# Patient Record
Sex: MALE | Race: WHITE | NOT HISPANIC OR LATINO | Employment: UNEMPLOYED | URBAN - NONMETROPOLITAN AREA
[De-identification: names, ages, dates, MRNs, and addresses within clinical notes are randomized per-mention and may not be internally consistent; named-entity substitution may affect disease eponyms.]

---

## 2020-11-19 ENCOUNTER — OFFICE VISIT (OUTPATIENT)
Dept: UROLOGY | Facility: CLINIC | Age: 65
End: 2020-11-19
Payer: MEDICARE

## 2020-11-19 VITALS
DIASTOLIC BLOOD PRESSURE: 78 MMHG | BODY MASS INDEX: 28.2 KG/M2 | SYSTOLIC BLOOD PRESSURE: 128 MMHG | WEIGHT: 197 LBS | HEIGHT: 70 IN | TEMPERATURE: 97.6 F | HEART RATE: 72 BPM

## 2020-11-19 DIAGNOSIS — R35.1 NOCTURIA: ICD-10-CM

## 2020-11-19 DIAGNOSIS — N40.1 BPH WITH OBSTRUCTION/LOWER URINARY TRACT SYMPTOMS: ICD-10-CM

## 2020-11-19 DIAGNOSIS — Z80.42 FAMILY HISTORY OF PROSTATE CANCER IN FATHER: Primary | ICD-10-CM

## 2020-11-19 DIAGNOSIS — N13.8 BPH WITH OBSTRUCTION/LOWER URINARY TRACT SYMPTOMS: ICD-10-CM

## 2020-11-19 DIAGNOSIS — R35.0 URINARY FREQUENCY: ICD-10-CM

## 2020-11-19 PROCEDURE — 99204 OFFICE O/P NEW MOD 45 MIN: CPT | Performed by: UROLOGY

## 2020-11-19 RX ORDER — LEVOTHYROXINE SODIUM 0.03 MG/1
25 TABLET ORAL DAILY
COMMUNITY
Start: 2020-07-27 | End: 2021-08-05

## 2020-11-19 RX ORDER — MAGNESIUM L-LACTATE 84 MG
84 TABLET, EXTENDED RELEASE ORAL DAILY
COMMUNITY
Start: 2020-10-05

## 2020-11-19 RX ORDER — PANTOPRAZOLE SODIUM 40 MG/1
TABLET, DELAYED RELEASE ORAL
COMMUNITY
Start: 2020-11-16

## 2020-11-19 RX ORDER — ROSUVASTATIN CALCIUM 20 MG/1
20 TABLET, COATED ORAL DAILY
COMMUNITY
Start: 2020-09-18 | End: 2021-09-18

## 2020-11-19 RX ORDER — IRBESARTAN 150 MG/1
300 TABLET ORAL 2 TIMES DAILY
COMMUNITY
Start: 2020-10-13

## 2020-11-19 RX ORDER — LABETALOL 100 MG/1
100 TABLET, FILM COATED ORAL 2 TIMES DAILY
COMMUNITY
Start: 2020-08-31 | End: 2021-08-31

## 2020-11-19 RX ORDER — AMLODIPINE BESYLATE 2.5 MG/1
5 TABLET ORAL DAILY
COMMUNITY
Start: 2020-11-09

## 2020-11-21 ENCOUNTER — HOSPITAL ENCOUNTER (OUTPATIENT)
Dept: ULTRASOUND IMAGING | Facility: HOSPITAL | Age: 65
Discharge: HOME | End: 2020-11-21
Attending: UROLOGY
Payer: MEDICARE

## 2020-11-21 ENCOUNTER — APPOINTMENT (OUTPATIENT)
Dept: LAB | Facility: HOSPITAL | Age: 65
End: 2020-11-21
Attending: UROLOGY
Payer: MEDICARE

## 2020-11-21 DIAGNOSIS — N40.1 BPH WITH OBSTRUCTION/LOWER URINARY TRACT SYMPTOMS: ICD-10-CM

## 2020-11-21 DIAGNOSIS — R35.0 URINARY FREQUENCY: ICD-10-CM

## 2020-11-21 DIAGNOSIS — R35.1 NOCTURIA: ICD-10-CM

## 2020-11-21 DIAGNOSIS — Z80.42 FAMILY HISTORY OF PROSTATE CANCER IN FATHER: ICD-10-CM

## 2020-11-21 DIAGNOSIS — N13.8 BPH WITH OBSTRUCTION/LOWER URINARY TRACT SYMPTOMS: ICD-10-CM

## 2020-11-21 LAB — PSA SERPL-MCNC: 5.4 NG/ML (ref 0–4)

## 2020-11-21 PROCEDURE — 84153 ASSAY OF PSA TOTAL: CPT

## 2020-11-21 PROCEDURE — 51798 US URINE CAPACITY MEASURE: CPT

## 2020-11-23 ENCOUNTER — TELEPHONE (OUTPATIENT)
Dept: UROLOGY | Facility: MEDICAL CENTER | Age: 65
End: 2020-11-23

## 2020-11-23 DIAGNOSIS — R97.20 ELEVATED PSA: Primary | ICD-10-CM

## 2020-11-27 ENCOUNTER — TELEPHONE (OUTPATIENT)
Dept: UROLOGY | Facility: MEDICAL CENTER | Age: 65
End: 2020-11-27

## 2020-11-27 DIAGNOSIS — R39.14 BENIGN PROSTATIC HYPERPLASIA WITH INCOMPLETE BLADDER EMPTYING: Primary | ICD-10-CM

## 2020-11-27 DIAGNOSIS — N40.1 BENIGN PROSTATIC HYPERPLASIA WITH INCOMPLETE BLADDER EMPTYING: Primary | ICD-10-CM

## 2020-11-27 RX ORDER — TAMSULOSIN HYDROCHLORIDE 0.4 MG/1
0.4 CAPSULE ORAL
Qty: 30 CAPSULE | Refills: 11 | Status: SHIPPED | OUTPATIENT
Start: 2020-11-27 | End: 2021-11-11

## 2021-01-07 ENCOUNTER — OFFICE VISIT (OUTPATIENT)
Dept: UROLOGY | Facility: CLINIC | Age: 66
End: 2021-01-07
Payer: MEDICARE

## 2021-01-07 VITALS
SYSTOLIC BLOOD PRESSURE: 140 MMHG | WEIGHT: 205 LBS | DIASTOLIC BLOOD PRESSURE: 80 MMHG | HEIGHT: 70 IN | BODY MASS INDEX: 29.35 KG/M2 | HEART RATE: 72 BPM

## 2021-01-07 DIAGNOSIS — N40.1 BPH WITH OBSTRUCTION/LOWER URINARY TRACT SYMPTOMS: Primary | ICD-10-CM

## 2021-01-07 DIAGNOSIS — Z80.42 FAMILY HISTORY OF PROSTATE CANCER: ICD-10-CM

## 2021-01-07 DIAGNOSIS — R97.20 ELEVATED PSA: ICD-10-CM

## 2021-01-07 DIAGNOSIS — R33.9 INCOMPLETE BLADDER EMPTYING: ICD-10-CM

## 2021-01-07 DIAGNOSIS — N13.8 BPH WITH OBSTRUCTION/LOWER URINARY TRACT SYMPTOMS: Primary | ICD-10-CM

## 2021-01-07 LAB — POST-VOID RESIDUAL VOLUME, ML POC: 571 ML

## 2021-01-07 PROCEDURE — 99213 OFFICE O/P EST LOW 20 MIN: CPT | Performed by: UROLOGY

## 2021-01-07 PROCEDURE — 51798 US URINE CAPACITY MEASURE: CPT | Performed by: UROLOGY

## 2021-01-07 RX ORDER — FINASTERIDE 5 MG/1
5 TABLET, FILM COATED ORAL DAILY
Qty: 90 TABLET | Refills: 3 | Status: SHIPPED | OUTPATIENT
Start: 2021-01-07 | End: 2021-11-09 | Stop reason: SDUPTHER

## 2021-01-07 RX ORDER — SEMAGLUTIDE 1.34 MG/ML
INJECTION, SOLUTION SUBCUTANEOUS
COMMUNITY
Start: 2020-12-14

## 2021-01-07 RX ORDER — IRBESARTAN 300 MG/1
300 TABLET ORAL
COMMUNITY
Start: 2020-12-07 | End: 2021-12-07

## 2021-01-07 NOTE — LETTER
2021     Eriberto Costa MD  [address] [address]    Patient: Daniel Madrid   YOB: 1955   Date of Visit: 2021       Dear Dr Cline Side: Thank you for referring Piotr Porter to me for evaluation  Below are my notes for this consultation  If you have questions, please do not hesitate to call me  I look forward to following your patient along with you  Sincerely,        Pranav Thomson MD        CC: No Recipients  Pranav Thomson MD  2021  3:59 PM  Sign when Signing Visit  [address] [facility]  [address], [address] [phone]  www  [facility]  org      NAME: Daniel Madrid  AGE: 77 y o  SEX: male  : 1955   MRN: [id number]    DATE: 2021  TIME: 8:35 AM    Assessment and Plan:    BPH with obstruction with incomplete bladder emptying, very large postvoid residuals  This is probably going on for years and he had no hydronephrosis on his renal ultrasound in September  Currently he feels he is voiding well and he is comfortable  No recurrent UTIs  No stones  I am very concerned about any surgical procedures because of his history of stroke and the difficulties in coming off of Eliquis  I therefore per for a more conservative approach with starting Proscar and rechecking an ultrasound in 6 months along with a PSA  Hopefully this will help him empty his bladder better along with Flomax and also if his PSA drops by greater than 50% he is unlikely have prostate cancer  Follow-up in 6 months with a renal ultrasound PSA  Chief Complaint   No chief complaint on file  History of Present Illness   BPH with obstruction with incomplete bladder emptying-renal ultrasound in November showed a PVR of 685 cc  We started him on Flomax 0 4 mg p o  Q h s  And he is here for PVR  He feels that the Flomax helped him  His prostate was measured at 97 cc  He also has an elevated PSA of 5 4 in November and may have a family history of prostate cancer in his father  He is prone to CVA and TIA if he goes off Eliquis  Therefore we have to be very careful about our biopsy decisions  He will need admission with heparin drip if he gets a biopsy  The following portions of the patient's history were reviewed and updated as appropriate: allergies, current medications, past family history, past medical history, past social history, past surgical history and problem list   Past Medical History:   Diagnosis Date    Diabetes mellitus ([facility] [address] )     High cholesterol     Hypertension     Stroke [facility])      Past Surgical History:   Procedure Laterality Date    ANKLE SURGERY Right     BACK SURGERY      REPLACEMENT TOTAL KNEE Bilateral      shoulder  Review of Systems   Review of Systems    Active Problem List   There is no problem list on file for this patient  Objective   There were no vitals taken for this visit      Physical Exam        Current Medications     Current Outpatient Medications:     amLODIPine (NORVASC) 2 5 mg tablet, Take 2 5 mg by mouth 2 (two) times a day, Disp: , Rfl:     apixaban (ELIQUIS) 5 mg, Take 5 mg by mouth 2 (two) times a day, Disp: , Rfl:     irbesartan (AVAPRO) 150 mg tablet, Take 300 mg by mouth 2 (two) times a day, Disp: , Rfl:     labetalol (NORMODYNE) 100 mg tablet, Take 100 mg by mouth 2 (two) times a day, Disp: , Rfl:     levothyroxine 25 mcg tablet, Take 25 mcg by mouth daily, Disp: , Rfl:     magnesium (MAGTAB) 84 MG (7MEQ) TBCR, Take 84 mg by mouth daily, Disp: , Rfl:     metFORMIN (GLUCOPHAGE) 1000 MG tablet, Take 1,000 mg by mouth 2 (two) times a day, Disp: , Rfl:     pantoprazole (PROTONIX) 40 mg tablet, Take by mouth, Disp: , Rfl:     rosuvastatin (CRESTOR) 20 MG tablet, Take 20 mg by mouth daily, Disp: , Rfl:     tamsulosin (FLOMAX) 0 4 mg, Take 1 capsule (0 4 mg total) by mouth daily with dinner, Disp: 30 capsule, Rfl: 11  Time-15 minutes      Jamal Nova MD

## 2021-01-07 NOTE — PROGRESS NOTES
[address] [facility]  [facility]  [address] [facility]  [place], [address] [facility]  [phone]  www  [facility]  org      NAME: Jaki Cowart  AGE: 77 y o  SEX: male  : 1955   MRN: [id number]    DATE: 2021  TIME: 8:35 AM    Assessment and Plan:    BPH with obstruction with incomplete bladder emptying, very large postvoid residuals  This is probably going on for years and he had no hydronephrosis on his renal ultrasound in September  Currently he feels he is voiding well and he is comfortable  No recurrent UTIs  No stones  I am very concerned about any surgical procedures because of his history of stroke and the difficulties in coming off of Eliquis  I therefore per for a more conservative approach with starting Proscar and rechecking an ultrasound in 6 months along with a PSA  Hopefully this will help him empty his bladder better along with Flomax and also if his PSA drops by greater than 50% he is unlikely have prostate cancer  Follow-up in 6 months with a renal ultrasound PSA  Chief Complaint   No chief complaint on file  History of Present Illness   BPH with obstruction with incomplete bladder emptying-renal ultrasound in November showed a PVR of 685 cc  We started him on Flomax 0 4 mg p o  Q h s  And he is here for PVR  He feels that the Flomax helped him  His prostate was measured at 97 cc  He also has an elevated PSA of 5 4 in November and may have a family history of prostate cancer in his father  He is prone to CVA and TIA if he goes off Eliquis  Therefore we have to be very careful about our biopsy decisions  He will need admission with heparin drip if he gets a biopsy      The following portions of the patient's history were reviewed and updated as appropriate: allergies, current medications, past family history, past medical history, past social history, past surgical history and problem list   Past Medical History: Diagnosis Date    Diabetes mellitus ([facility] [address] )     High cholesterol     Hypertension     Stroke [facility])      Past Surgical History:   Procedure Laterality Date    ANKLE SURGERY Right     BACK SURGERY      REPLACEMENT TOTAL KNEE Bilateral      shoulder  Review of Systems   Review of Systems    Active Problem List   There is no problem list on file for this patient  Objective   There were no vitals taken for this visit      Physical Exam        Current Medications     Current Outpatient Medications:     amLODIPine (NORVASC) 2 5 mg tablet, Take 2 5 mg by mouth 2 (two) times a day, Disp: , Rfl:     apixaban (ELIQUIS) 5 mg, Take 5 mg by mouth 2 (two) times a day, Disp: , Rfl:     irbesartan (AVAPRO) 150 mg tablet, Take 300 mg by mouth 2 (two) times a day, Disp: , Rfl:     labetalol (NORMODYNE) 100 mg tablet, Take 100 mg by mouth 2 (two) times a day, Disp: , Rfl:     levothyroxine 25 mcg tablet, Take 25 mcg by mouth daily, Disp: , Rfl:     magnesium (MAGTAB) 84 MG (7MEQ) TBCR, Take 84 mg by mouth daily, Disp: , Rfl:     metFORMIN (GLUCOPHAGE) 1000 MG tablet, Take 1,000 mg by mouth 2 (two) times a day, Disp: , Rfl:     pantoprazole (PROTONIX) 40 mg tablet, Take by mouth, Disp: , Rfl:     rosuvastatin (CRESTOR) 20 MG tablet, Take 20 mg by mouth daily, Disp: , Rfl:     tamsulosin (FLOMAX) 0 4 mg, Take 1 capsule (0 4 mg total) by mouth daily with dinner, Disp: 30 capsule, Rfl: 11  Time-15 minutes      Leeroy Kelly MD

## 2021-03-10 DIAGNOSIS — Z23 ENCOUNTER FOR IMMUNIZATION: ICD-10-CM

## 2021-07-07 ENCOUNTER — HOSPITAL ENCOUNTER (OUTPATIENT)
Dept: ULTRASOUND IMAGING | Facility: HOSPITAL | Age: 66
Discharge: HOME | End: 2021-07-07
Attending: UROLOGY
Payer: MEDICARE

## 2021-07-07 ENCOUNTER — APPOINTMENT (OUTPATIENT)
Dept: LAB | Facility: HOSPITAL | Age: 66
End: 2021-07-07
Attending: UROLOGY
Payer: MEDICARE

## 2021-07-07 DIAGNOSIS — N13.8 BPH WITH OBSTRUCTION/LOWER URINARY TRACT SYMPTOMS: ICD-10-CM

## 2021-07-07 DIAGNOSIS — N40.1 BPH WITH OBSTRUCTION/LOWER URINARY TRACT SYMPTOMS: ICD-10-CM

## 2021-07-07 DIAGNOSIS — R33.9 INCOMPLETE BLADDER EMPTYING: ICD-10-CM

## 2021-07-07 DIAGNOSIS — R97.20 ELEVATED PSA: ICD-10-CM

## 2021-07-07 DIAGNOSIS — Z80.42 FAMILY HISTORY OF PROSTATE CANCER: ICD-10-CM

## 2021-07-07 LAB — PSA SERPL-MCNC: 1.4 NG/ML (ref 0–4)

## 2021-07-07 PROCEDURE — 84153 ASSAY OF PSA TOTAL: CPT

## 2021-07-07 PROCEDURE — 76770 US EXAM ABDO BACK WALL COMP: CPT

## 2021-08-04 RX ORDER — ASPIRIN 81 MG/1
TABLET ORAL
COMMUNITY
Start: 2021-05-19

## 2021-08-04 RX ORDER — FERROUS SULFATE 325(65) MG
TABLET ORAL
COMMUNITY

## 2021-08-04 RX ORDER — METOPROLOL SUCCINATE 50 MG/1
50 TABLET, EXTENDED RELEASE ORAL 2 TIMES DAILY
COMMUNITY
Start: 2021-05-19

## 2021-08-04 NOTE — PROGRESS NOTES
[address] [facility]  [facility]  [address] [facility]  [place], [address] [facility]  [phone]  www  [facility]  org      NAME: Ledora Dancer  AGE: 74 y o  SEX: male  : 1955   MRN: [id number]    DATE: 2021  TIME: 12:08 PM    Assessment and Plan:    BPH with obstruction with incomplete bladder emptying with large postvoid residual-his creatinine is excellent at 0 89 and he has no hydronephrosis  His voiding pattern is to his satisfaction currently  His PSA dropped nicely to 1 4 with the Proscar  He is at high risk of stroke if he goes off of his Eliquis so I wish to avoid surgical treatment  We will continue with the current regimen and when Rezum is available that may be an option to reduce prostate size and he can stay on the Eliquis  Otherwise see me in a year with a PSA and renal ultrasound  Chief Complaint   No chief complaint on file  History of Present Illness   Follow-up BPH with obstruction with incomplete bladder emptying, a very large postvoid residual probably going on for years  I have been avoiding surgical procedures because of his history of stroke in the difficulties in coming off of Eliquis  I started him on Proscar in 2021  He is also on Flomax  Also has a history of elevated PSA  PSA has dropped to 1 4  This is as of 2012  Renal ultrasound 2021 shows that he still has a very large PVR at 673 cc and this was down from 824 cc prevoid  He has a large prostate measuring 55 cc indenting the floor the bladder        The following portions of the patient's history were reviewed and updated as appropriate: allergies, current medications, past family history, past medical history, past social history, past surgical history and problem list   Past Medical History:   Diagnosis Date    Diabetes mellitus ([facility] [address] )     High cholesterol     Hypertension     Stroke [facility])      Past Surgical History:   Procedure Laterality Date    ANKLE SURGERY Right     BACK SURGERY      REPLACEMENT TOTAL KNEE Bilateral      shoulder  Review of Systems   Review of Systems   Constitutional: Negative for fever  Respiratory: Negative for shortness of breath  Cardiovascular: Negative for chest pain  Genitourinary: Negative  Active Problem List   There is no problem list on file for this patient  Objective   /80 (BP Location: Left arm, Patient Position: Sitting)   Pulse 71   Ht 5' 10" (1 778 m)   Wt 93 kg (205 lb)   BMI 29 41 kg/m²     Physical Exam  Constitutional:       Appearance: Normal appearance  HENT:      Head: Normocephalic and atraumatic  Eyes:      Extraocular Movements: Extraocular movements intact  Pulmonary:      Effort: Pulmonary effort is normal    Musculoskeletal:         General: Normal range of motion  Cervical back: Normal range of motion  Skin:     Coloration: Skin is not jaundiced or pale  Neurological:      General: No focal deficit present  Mental Status: He is alert and oriented to person, place, and time  Mental status is at baseline  Psychiatric:         Mood and Affect: Mood normal          Behavior: Behavior normal          Thought Content:  Thought content normal          Judgment: Judgment normal              Current Medications     Current Outpatient Medications:     amLODIPine (NORVASC) 2 5 mg tablet, Take 5 mg by mouth daily , Disp: , Rfl:     apixaban (ELIQUIS) 5 mg, Take 5 mg by mouth 2 (two) times a day, Disp: , Rfl:     aspirin (ECOTRIN LOW STRENGTH) 81 mg EC tablet, Take by mouth, Disp: , Rfl:     finasteride (PROSCAR) 5 mg tablet, Take 1 tablet (5 mg total) by mouth daily, Disp: 90 tablet, Rfl: 3    irbesartan (AVAPRO) 300 mg tablet, Take 300 mg by mouth, Disp: , Rfl:     levothyroxine 25 mcg tablet, Take 25 mcg by mouth daily, Disp: , Rfl:     magnesium (MAGTAB) 84 MG (7MEQ) TBCR, Take 84 mg by mouth daily, Disp: , Rfl:     metFORMIN (GLUCOPHAGE) 1000 MG tablet, Take 500 mg by mouth once , Disp: , Rfl:     metoprolol succinate (TOPROL-XL) 50 mg 24 hr tablet, Take 50 mg by mouth 2 (two) times a day, Disp: , Rfl:     Ozempic, 0 25 or 0 5 MG/DOSE, 2 MG/1 5ML SOPN, INJECT 0 25 MG UNDER THE SKIN ONCE A WEEK, Disp: , Rfl:     pantoprazole (PROTONIX) 40 mg tablet, Take by mouth, Disp: , Rfl:     rosuvastatin (CRESTOR) 20 MG tablet, Take 20 mg by mouth daily, Disp: , Rfl:     tamsulosin (FLOMAX) 0 4 mg, Take 1 capsule (0 4 mg total) by mouth daily with dinner, Disp: 30 capsule, Rfl: 11    ferrous sulfate 325 (65 Fe) mg tablet, Take by mouth, Disp: , Rfl:     irbesartan (AVAPRO) 150 mg tablet, Take 300 mg by mouth 2 (two) times a day, Disp: , Rfl:     labetalol (NORMODYNE) 100 mg tablet, Take 100 mg by mouth 2 (two) times a day, Disp: , Rfl:         Eloina Harris MD

## 2021-08-05 ENCOUNTER — OFFICE VISIT (OUTPATIENT)
Dept: UROLOGY | Facility: CLINIC | Age: 66
End: 2021-08-05
Payer: MEDICARE

## 2021-08-05 VITALS
BODY MASS INDEX: 29.35 KG/M2 | SYSTOLIC BLOOD PRESSURE: 120 MMHG | WEIGHT: 205 LBS | HEIGHT: 70 IN | DIASTOLIC BLOOD PRESSURE: 80 MMHG | HEART RATE: 71 BPM

## 2021-08-05 DIAGNOSIS — N40.1 BPH WITH OBSTRUCTION/LOWER URINARY TRACT SYMPTOMS: Primary | ICD-10-CM

## 2021-08-05 DIAGNOSIS — N13.8 BPH WITH OBSTRUCTION/LOWER URINARY TRACT SYMPTOMS: Primary | ICD-10-CM

## 2021-08-05 DIAGNOSIS — R33.9 INCOMPLETE BLADDER EMPTYING: ICD-10-CM

## 2021-08-05 DIAGNOSIS — R97.20 ELEVATED PSA: ICD-10-CM

## 2021-08-05 PROCEDURE — 99213 OFFICE O/P EST LOW 20 MIN: CPT | Performed by: UROLOGY

## 2021-11-08 ENCOUNTER — TELEPHONE (OUTPATIENT)
Dept: OTHER | Facility: OTHER | Age: 66
End: 2021-11-08

## 2021-11-11 DIAGNOSIS — N40.1 BENIGN PROSTATIC HYPERPLASIA WITH INCOMPLETE BLADDER EMPTYING: ICD-10-CM

## 2021-11-11 DIAGNOSIS — R39.14 BENIGN PROSTATIC HYPERPLASIA WITH INCOMPLETE BLADDER EMPTYING: ICD-10-CM

## 2021-11-11 RX ORDER — TAMSULOSIN HYDROCHLORIDE 0.4 MG/1
CAPSULE ORAL
Qty: 30 CAPSULE | Refills: 0 | Status: SHIPPED | OUTPATIENT
Start: 2021-11-11 | End: 2021-12-27

## 2021-11-18 ENCOUNTER — LAB (OUTPATIENT)
Dept: LAB | Facility: HOSPITAL | Age: 66
End: 2021-11-18
Attending: UROLOGY
Payer: MEDICARE

## 2021-11-18 DIAGNOSIS — R97.20 ELEVATED PSA: ICD-10-CM

## 2021-11-18 PROCEDURE — 84153 ASSAY OF PSA TOTAL: CPT

## 2021-11-19 LAB — PSA SERPL-MCNC: 1.4 NG/ML (ref 0–4)

## 2021-12-27 DIAGNOSIS — N40.1 BENIGN PROSTATIC HYPERPLASIA WITH INCOMPLETE BLADDER EMPTYING: ICD-10-CM

## 2021-12-27 DIAGNOSIS — R39.14 BENIGN PROSTATIC HYPERPLASIA WITH INCOMPLETE BLADDER EMPTYING: ICD-10-CM

## 2021-12-27 RX ORDER — TAMSULOSIN HYDROCHLORIDE 0.4 MG/1
CAPSULE ORAL
Qty: 30 CAPSULE | Refills: 12 | Status: SHIPPED | OUTPATIENT
Start: 2021-12-27

## 2022-07-05 ENCOUNTER — TELEPHONE (OUTPATIENT)
Dept: CARDIOLOGY CLINIC | Facility: CLINIC | Age: 67
End: 2022-07-05

## 2022-07-25 ENCOUNTER — HOSPITAL ENCOUNTER (OUTPATIENT)
Dept: ULTRASOUND IMAGING | Facility: HOSPITAL | Age: 67
Discharge: HOME | End: 2022-07-25
Attending: UROLOGY
Payer: MEDICARE

## 2022-07-25 DIAGNOSIS — N40.1 BPH WITH OBSTRUCTION/LOWER URINARY TRACT SYMPTOMS: ICD-10-CM

## 2022-07-25 DIAGNOSIS — R33.9 INCOMPLETE BLADDER EMPTYING: ICD-10-CM

## 2022-07-25 DIAGNOSIS — N13.8 BPH WITH OBSTRUCTION/LOWER URINARY TRACT SYMPTOMS: ICD-10-CM

## 2022-07-25 PROCEDURE — 76770 US EXAM ABDO BACK WALL COMP: CPT

## 2022-08-29 RX ORDER — POLYETHYLENE GLYCOL 3350, SODIUM CHLORIDE, SODIUM BICARBONATE, POTASSIUM CHLORIDE 420; 11.2; 5.72; 1.48 G/4L; G/4L; G/4L; G/4L
POWDER, FOR SOLUTION ORAL
COMMUNITY
Start: 2022-07-14

## 2022-09-01 ENCOUNTER — OFFICE VISIT (OUTPATIENT)
Dept: UROLOGY | Facility: CLINIC | Age: 67
End: 2022-09-01
Payer: MEDICARE

## 2022-09-01 ENCOUNTER — APPOINTMENT (OUTPATIENT)
Dept: LAB | Facility: HOSPITAL | Age: 67
End: 2022-09-01
Attending: UROLOGY
Payer: MEDICARE

## 2022-09-01 VITALS
SYSTOLIC BLOOD PRESSURE: 160 MMHG | HEART RATE: 86 BPM | WEIGHT: 185 LBS | OXYGEN SATURATION: 97 % | DIASTOLIC BLOOD PRESSURE: 82 MMHG | HEIGHT: 70 IN | BODY MASS INDEX: 26.48 KG/M2

## 2022-09-01 DIAGNOSIS — N40.1 BPH WITH OBSTRUCTION/LOWER URINARY TRACT SYMPTOMS: ICD-10-CM

## 2022-09-01 DIAGNOSIS — N13.8 BPH WITH OBSTRUCTION/LOWER URINARY TRACT SYMPTOMS: ICD-10-CM

## 2022-09-01 DIAGNOSIS — E11.9 TYPE 2 DIABETES MELLITUS WITHOUT COMPLICATION, WITHOUT LONG-TERM CURRENT USE OF INSULIN (HCC): ICD-10-CM

## 2022-09-01 DIAGNOSIS — Z80.42 FAMILY HISTORY OF PROSTATE CANCER IN FATHER: ICD-10-CM

## 2022-09-01 DIAGNOSIS — R33.9 INCOMPLETE BLADDER EMPTYING: ICD-10-CM

## 2022-09-01 DIAGNOSIS — R97.20 ELEVATED PSA: Primary | ICD-10-CM

## 2022-09-01 DIAGNOSIS — R97.20 ELEVATED PSA: ICD-10-CM

## 2022-09-01 LAB — PSA SERPL-MCNC: 3.3 NG/ML (ref 0–4)

## 2022-09-01 PROCEDURE — 84153 ASSAY OF PSA TOTAL: CPT

## 2022-09-01 PROCEDURE — 99214 OFFICE O/P EST MOD 30 MIN: CPT | Performed by: UROLOGY

## 2022-09-01 NOTE — PROGRESS NOTES
[address] [facility]  [facility]  [address] [facility]  [place], [address] [facility]  [phone]  www  [facility]  org      NAME: Vishnu Lainez  AGE: 69 y o  SEX: male  : 1955   MRN: [id number]    DATE: 2022  TIME: 9:26 AM    Assessment and Plan:    BPH with obstruction with incomplete bladder emptying with a very high postvoid residual which is chronic-stable, no hydronephrosis, very happy with his voiding pattern  Recheck kidney bladder ultrasound with PVR in 1 year  I instructed him to make sure that he takes his best effort to empty completely during the test   No indications for surgical therapy presently  Continue with Flomax and the Proscar  History of elevated PSA normalized on Proscar, family history of prostate cancer in his father:  Check PSA and send him the results  We will then check another PSA when he comes back in 1 year  Chief Complaint   No chief complaint on file  History of Present Illness   Follow-up BPH with obstruction with incomplete bladder emptying with a very large postvoid residual which is chronic  History of elevated PSA which normalized with Proscar-has strong family history of prostate cancer in his father  Has been on Eliquis  Have been holding off any surgical treatment due to him being on Eliquis and the difficulties of coming off of the because of his history of stroke  Kidney bladder ultrasound shows no hydronephrosis, no stones and a 605 cc postvoid residual 2022-however he did not empty completely as he could have and he was able urinate a lot more once he got home  Henri Raygoza He has enlarged prostate gland which protrudes into the bladder base  Creatinine was normal at 1 01 3/7/2022  Last PSA was 1 4 2021  He is status post cardiac ablation for atrial fibrillation  Remains on Flomax  Only gets up 1 time a night    Good stream       The following portions of the patient's history were reviewed and updated as appropriate: allergies, current medications, past family history, past medical history, past social history, past surgical history and problem list   Past Medical History:   Diagnosis Date    Diabetes mellitus ([facility] [address] )     High cholesterol     Hypertension     Stroke [facility])      Past Surgical History:   Procedure Laterality Date    ANKLE SURGERY Right     BACK SURGERY      REPLACEMENT TOTAL KNEE Bilateral     SHOULDER SURGERY Right      shoulder  Review of Systems   Review of Systems   Constitutional: Negative for fever  Respiratory: Negative for shortness of breath  Cardiovascular: Negative for chest pain  Genitourinary: Negative  Active Problem List   There is no problem list on file for this patient  Objective   /82 (BP Location: Left arm, Patient Position: Sitting)   Pulse 86   Ht 5' 10" (1 778 m)   Wt 83 9 kg (185 lb)   SpO2 97%   BMI 26 54 kg/m²     Physical Exam  Vitals reviewed  Constitutional:       Appearance: Normal appearance  He is normal weight  HENT:      Head: Normocephalic and atraumatic  Eyes:      Extraocular Movements: Extraocular movements intact  Pulmonary:      Effort: Pulmonary effort is normal    Genitourinary:     Prostate: Normal       Rectum: Normal       Comments: Rectal exam reveals normal tone no masses and his prostate is about 60-80 g, smooth symmetric benign  No nodules  Musculoskeletal:         General: Normal range of motion  Cervical back: Normal range of motion  Skin:     Coloration: Skin is not jaundiced or pale  Neurological:      General: No focal deficit present  Mental Status: He is alert and oriented to person, place, and time  Mental status is at baseline  Psychiatric:         Mood and Affect: Mood normal          Behavior: Behavior normal          Thought Content:  Thought content normal          Judgment: Judgment normal              Current Medications     Current Outpatient Medications:   amLODIPine (NORVASC) 2 5 mg tablet, Take 5 mg by mouth daily , Disp: , Rfl:     apixaban (ELIQUIS) 5 mg, Take 5 mg by mouth 2 (two) times a day, Disp: , Rfl:     finasteride (PROSCAR) 5 mg tablet, Take 1 tablet (5 mg total) by mouth daily, Disp: 90 tablet, Rfl: 3    irbesartan (AVAPRO) 150 mg tablet, Take 300 mg by mouth 2 (two) times a day, Disp: , Rfl:     levothyroxine 25 mcg tablet, Take 25 mcg by mouth daily, Disp: , Rfl:     magnesium (MAGTAB) 84 MG (7MEQ) TBCR, Take 84 mg by mouth daily, Disp: , Rfl:     metoprolol succinate (TOPROL-XL) 50 mg 24 hr tablet, Take 50 mg by mouth 2 (two) times a day, Disp: , Rfl:     Ozempic, 0 25 or 0 5 MG/DOSE, 2 MG/1 5ML SOPN, INJECT 0 25 MG UNDER THE SKIN ONCE A WEEK, Disp: , Rfl:     pantoprazole (PROTONIX) 40 mg tablet, Take by mouth, Disp: , Rfl:     rosuvastatin (CRESTOR) 20 MG tablet, Take 20 mg by mouth daily, Disp: , Rfl:     tamsulosin (FLOMAX) 0 4 mg, TAKE 1 CAPSULE BY MOUTH ONCE DAILY WITH SUPPER, Disp: 30 capsule, Rfl: 12    aspirin (ECOTRIN LOW STRENGTH) 81 mg EC tablet, Take by mouth, Disp: , Rfl:     ferrous sulfate 325 (65 Fe) mg tablet, Take by mouth, Disp: , Rfl:     irbesartan (AVAPRO) 300 mg tablet, Take 300 mg by mouth, Disp: , Rfl:     labetalol (NORMODYNE) 100 mg tablet, Take 100 mg by mouth 2 (two) times a day, Disp: , Rfl:     metFORMIN (GLUCOPHAGE) 1000 MG tablet, Take 500 mg by mouth once , Disp: , Rfl:     polyethylene glycol-electrolytes (NULYTELY) 4000 mL solution, USE AS DIRECTED FOR COLONOSCOPY PREP, Disp: , Rfl:         Tonny Stephen MD

## 2022-09-01 NOTE — PATIENT INSTRUCTIONS
Continue with Flomax and the Proscar    Have PSA drawn and I will send you the results via the portal

## 2022-09-02 DIAGNOSIS — Z80.42 FAMILY HISTORY OF PROSTATE CANCER IN FATHER: Primary | ICD-10-CM

## 2022-09-02 DIAGNOSIS — R97.20 ELEVATED PSA: ICD-10-CM

## 2023-09-01 ENCOUNTER — HOSPITAL ENCOUNTER (OUTPATIENT)
Dept: ULTRASOUND IMAGING | Facility: HOSPITAL | Age: 68
End: 2023-09-01
Attending: UROLOGY
Payer: MEDICARE

## 2023-09-01 DIAGNOSIS — N40.1 BPH WITH OBSTRUCTION/LOWER URINARY TRACT SYMPTOMS: ICD-10-CM

## 2023-09-01 DIAGNOSIS — R33.9 INCOMPLETE BLADDER EMPTYING: ICD-10-CM

## 2023-09-01 DIAGNOSIS — N13.8 BPH WITH OBSTRUCTION/LOWER URINARY TRACT SYMPTOMS: ICD-10-CM

## 2023-09-01 LAB — PSA SERPL-MCNC: 1.41 NG/ML (ref 0–4)

## 2023-09-01 PROCEDURE — 76770 US EXAM ABDO BACK WALL COMP: CPT

## 2023-09-02 ENCOUNTER — APPOINTMENT (OUTPATIENT)
Dept: LAB | Facility: HOSPITAL | Age: 68
End: 2023-09-02
Payer: MEDICARE

## 2023-09-02 DIAGNOSIS — Z80.42 FAMILY HISTORY OF PROSTATE CANCER IN FATHER: ICD-10-CM

## 2023-09-02 DIAGNOSIS — R97.20 ELEVATED PSA: ICD-10-CM

## 2023-09-02 PROCEDURE — 84153 ASSAY OF PSA TOTAL: CPT

## 2023-09-02 PROCEDURE — 36415 COLL VENOUS BLD VENIPUNCTURE: CPT

## 2023-09-03 NOTE — PROGRESS NOTES
UROLOGY PROGRESS NOTE         NAME: Alexis Funez  AGE: 77 y.o. SEX: male  : 1955   MRN: [id number]    DATE: 2023  TIME: 10:10 PM    Assessment and Plan   Procedures     Impression:   1. Incomplete emptying of bladder    2. History of elevated PSA    3. Benign prostatic hyperplasia with nocturia         Plan: Plan follow-up on renal ultrasound report. Stop Flomax continue Proscar. Again I offered to consider intermittent catheterization but the patient declines at this time. He knows to call if he gets voiding symptoms or UTI-like symptoms that worsen. We will give him a slip to get a BMP done to make sure his creatinine stable and if he is doing well follow-up in 1 year. Chief Complaint   No chief complaint on file. History of Present Illness     HPI: Alexis Funez is a 77y.o. year old male who presents with 1 year follow-up. Patient last seen by Dr. Verner Fabian on 2022. Patient with a history of BPH with obstruction and incomplete bladder emptying with very high postvoid residuals which according to Dr. Verner Fabian is stable with no hydronephrosis and happy the way he is voiding. Patient was on Flomax and Proscar. Has a history of elevated PSA that normalized with Proscar. Does have a family history of prostate cancer    Per Dr. Kinga Camargo notes his PVR was run over 600 cc. He was to double void. His PSA on 2023 was 1.4. Recent creatinine level May 2023 was normal at 0.85. Patient had recent renal ultrasound on 2023. This was ordered by Dr. Verner Fabian. Report is pending but on my review no hydronephrosis bilaterally. Patient feels well currently asymptomatic no voiding complaints he does double void. PVR was around 700 cc which is about average. Patient was asked if he can stop the Flomax and Proscar. I recommended stopping Flomax only if he notices symptoms worsening to restart it.         The following portions of the patient's history were reviewed and updated as appropriate: allergies, current medications, past family history, past medical history, past social history, past surgical history and problem list.  Past Medical History:   Diagnosis Date   • Diabetes mellitus ([address])    • High cholesterol    • Hypertension    • Stroke [facility])      Past Surgical History:   Procedure Laterality Date   • ANKLE SURGERY Right    • BACK SURGERY     • REPLACEMENT TOTAL KNEE Bilateral    • SHOULDER SURGERY Right      shoulder  Review of Systems     Const: Denies chills, fever and weight loss. CV: Denies chest pain. Resp: Denies SOB. GI: Denies abdominal pain, nausea and vomiting. : Denies symptoms other than stated above. Musculo: Denies back pain. Objective   There were no vitals taken for this visit. Physical Exam  Const: Appears healthy and well developed. No signs of acute distress present. Resp: Respirations are regular and unlabored. CV: Rate is regular. Rhythm is regular. Abdomen: Abdomen is soft, nontender, and nondistended. Kidneys are not palpable. : Normal external genital exam the bladder was slightly distended but asymptomatic. Prostate enlarged but smooth  Psych: Patient's attitude is cooperative.  Mood is normal. Affect is normal.    Current Medications     Current Outpatient Medications:   •  amLODIPine (NORVASC) 2.5 mg tablet, Take 5 mg by mouth daily , Disp: , Rfl:   •  apixaban (ELIQUIS) 5 mg, Take 5 mg by mouth 2 (two) times a day, Disp: , Rfl:   •  aspirin (ECOTRIN LOW STRENGTH) 81 mg EC tablet, Take by mouth, Disp: , Rfl:   •  ferrous sulfate 325 (65 Fe) mg tablet, Take by mouth, Disp: , Rfl:   •  finasteride (PROSCAR) 5 mg tablet, Take 1 tablet (5 mg total) by mouth daily, Disp: 90 tablet, Rfl: 3  •  irbesartan (AVAPRO) 150 mg tablet, Take 300 mg by mouth 2 (two) times a day, Disp: , Rfl:   •  labetalol (NORMODYNE) 100 mg tablet, Take 100 mg by mouth 2 (two) times a day, Disp: , Rfl:   •  levothyroxine 25 mcg tablet, Take 25 mcg by mouth daily, Disp: , Rfl:   •  magnesium (MAGTAB) 84 MG (7MEQ) TBCR, Take 84 mg by mouth daily, Disp: , Rfl:   •  metFORMIN (GLUCOPHAGE) 1000 MG tablet, Take 500 mg by mouth once , Disp: , Rfl:   •  metoprolol succinate (TOPROL-XL) 50 mg 24 hr tablet, Take 50 mg by mouth 2 (two) times a day, Disp: , Rfl:   •  Ozempic, 0.25 or 0.5 MG/DOSE, 2 MG/1.5ML SOPN, INJECT 0.25 MG UNDER THE SKIN ONCE A WEEK, Disp: , Rfl:   •  pantoprazole (PROTONIX) 40 mg tablet, Take by mouth, Disp: , Rfl:   •  polyethylene glycol-electrolytes (NULYTELY) 4000 mL solution, USE AS DIRECTED FOR COLONOSCOPY PREP, Disp: , Rfl:   •  rosuvastatin (CRESTOR) 20 MG tablet, Take 20 mg by mouth daily, Disp: , Rfl:   •  tamsulosin (FLOMAX) 0.4 mg, TAKE 1 CAPSULE BY MOUTH ONCE DAILY WITH SUPPER, Disp: 30 capsule, Rfl: 12        Georgiana Hendrickson MD

## 2023-09-05 RX ORDER — AMLODIPINE BESYLATE 5 MG/1
TABLET ORAL
COMMUNITY
Start: 2023-06-20

## 2023-09-07 ENCOUNTER — OFFICE VISIT (OUTPATIENT)
Dept: UROLOGY | Facility: CLINIC | Age: 68
End: 2023-09-07
Payer: MEDICARE

## 2023-09-07 ENCOUNTER — APPOINTMENT (OUTPATIENT)
Dept: LAB | Facility: HOSPITAL | Age: 68
End: 2023-09-07
Payer: MEDICARE

## 2023-09-07 ENCOUNTER — TELEPHONE (OUTPATIENT)
Age: 68
End: 2023-09-07

## 2023-09-07 VITALS
OXYGEN SATURATION: 97 % | BODY MASS INDEX: 28.39 KG/M2 | WEIGHT: 202.8 LBS | TEMPERATURE: 97.3 F | DIASTOLIC BLOOD PRESSURE: 84 MMHG | SYSTOLIC BLOOD PRESSURE: 142 MMHG | HEIGHT: 71 IN | HEART RATE: 83 BPM

## 2023-09-07 DIAGNOSIS — Z87.898 HISTORY OF ELEVATED PSA: ICD-10-CM

## 2023-09-07 DIAGNOSIS — E11.9 TYPE 2 DIABETES MELLITUS WITHOUT COMPLICATION, WITHOUT LONG-TERM CURRENT USE OF INSULIN (HCC): ICD-10-CM

## 2023-09-07 DIAGNOSIS — R33.9 INCOMPLETE EMPTYING OF BLADDER: Primary | ICD-10-CM

## 2023-09-07 DIAGNOSIS — R35.1 BENIGN PROSTATIC HYPERPLASIA WITH NOCTURIA: ICD-10-CM

## 2023-09-07 DIAGNOSIS — N40.1 BENIGN PROSTATIC HYPERPLASIA WITH NOCTURIA: ICD-10-CM

## 2023-09-07 LAB
ANION GAP SERPL CALCULATED.3IONS-SCNC: 5 MMOL/L
BUN SERPL-MCNC: 14 MG/DL (ref 5–25)
CALCIUM SERPL-MCNC: 9.2 MG/DL (ref 8.4–10.2)
CHLORIDE SERPL-SCNC: 105 MMOL/L (ref 96–108)
CO2 SERPL-SCNC: 27 MMOL/L (ref 21–32)
CREAT SERPL-MCNC: 0.89 MG/DL (ref 0.6–1.3)
GFR SERPL CREATININE-BSD FRML MDRD: 86 ML/MIN/1.73SQ M
GLUCOSE P FAST SERPL-MCNC: 198 MG/DL (ref 65–99)
POTASSIUM SERPL-SCNC: 4.3 MMOL/L (ref 3.5–5.3)
SL AMB  POCT GLUCOSE, UA: NORMAL
SL AMB LEUKOCYTE ESTERASE,UA: NORMAL
SL AMB POCT BILIRUBIN,UA: NORMAL
SL AMB POCT BLOOD,UA: NORMAL
SL AMB POCT CLARITY,UA: CLEAR
SL AMB POCT COLOR,UA: YELLOW
SL AMB POCT KETONES,UA: NORMAL
SL AMB POCT NITRITE,UA: NORMAL
SL AMB POCT PH,UA: 6.5
SL AMB POCT SPECIFIC GRAVITY,UA: 1.01
SL AMB POCT URINE PROTEIN: NORMAL
SL AMB POCT UROBILINOGEN: 0.2
SODIUM SERPL-SCNC: 137 MMOL/L (ref 135–147)

## 2023-09-07 PROCEDURE — 36415 COLL VENOUS BLD VENIPUNCTURE: CPT

## 2023-09-07 PROCEDURE — 81003 URINALYSIS AUTO W/O SCOPE: CPT | Performed by: UROLOGY

## 2023-09-07 PROCEDURE — 99214 OFFICE O/P EST MOD 30 MIN: CPT | Performed by: UROLOGY

## 2023-09-07 PROCEDURE — 80048 BASIC METABOLIC PNL TOTAL CA: CPT

## 2023-09-07 NOTE — TELEPHONE ENCOUNTER
Pt states there are meds on his list that he is not taking. Advised pt that meds on his list I put as not taking. Pt verbalized understanding. Nothing further needed.

## 2023-09-07 NOTE — TELEPHONE ENCOUNTER
Pt was in to the office today and he was looking at his AVS and the  Med list is not right. He would like a call back to discuss and correct.      Pt can be reached at [phone]

## 2023-09-08 ENCOUNTER — TELEPHONE (OUTPATIENT)
Age: 68
End: 2023-09-08

## 2023-09-08 NOTE — TELEPHONE ENCOUNTER
Significant Findings  PT managed by [facility]  Radiology Test Results -  Radiology Calling with report update:  [facility] Bladder   Please review imaging